# Patient Record
Sex: MALE | Race: WHITE | NOT HISPANIC OR LATINO | Employment: FULL TIME | URBAN - METROPOLITAN AREA
[De-identification: names, ages, dates, MRNs, and addresses within clinical notes are randomized per-mention and may not be internally consistent; named-entity substitution may affect disease eponyms.]

---

## 2024-06-19 ENCOUNTER — HOSPITAL ENCOUNTER (EMERGENCY)
Facility: HOSPITAL | Age: 34
Discharge: HOME | End: 2024-06-20
Attending: EMERGENCY MEDICINE
Payer: MEDICAID

## 2024-06-19 VITALS
OXYGEN SATURATION: 100 % | SYSTOLIC BLOOD PRESSURE: 151 MMHG | TEMPERATURE: 97.5 F | DIASTOLIC BLOOD PRESSURE: 101 MMHG | RESPIRATION RATE: 16 BRPM | HEART RATE: 85 BPM | BODY MASS INDEX: 24.09 KG/M2 | WEIGHT: 204 LBS | HEIGHT: 77 IN

## 2024-06-19 DIAGNOSIS — K64.4 EXTERNAL HEMORRHOID: Primary | ICD-10-CM

## 2024-06-19 PROCEDURE — 99283 EMERGENCY DEPT VISIT LOW MDM: CPT

## 2024-06-19 RX ORDER — HYDROCODONE BITARTRATE AND ACETAMINOPHEN 5; 325 MG/1; MG/1
1 TABLET ORAL EVERY 6 HOURS PRN
Status: DISCONTINUED | OUTPATIENT
Start: 2024-06-19 | End: 2024-06-20 | Stop reason: HOSPADM

## 2024-06-19 RX ORDER — LIDOCAINE HYDROCHLORIDE AND HYDROCORTISONE ACETATE 30; 5 MG/G; MG/G
1 CREAM RECTAL 2 TIMES DAILY PRN
Qty: 7 G | Refills: 2 | Status: SHIPPED | OUTPATIENT
Start: 2024-06-19 | End: 2024-06-26

## 2024-06-19 RX ORDER — HYDROCODONE BITARTRATE AND ACETAMINOPHEN 5; 325 MG/1; MG/1
1 TABLET ORAL EVERY 6 HOURS PRN
Qty: 12 TABLET | Refills: 0 | Status: SHIPPED | OUTPATIENT
Start: 2024-06-19

## 2024-06-19 RX ADMIN — HYDROCODONE BITARTRATE AND ACETAMINOPHEN 1 TABLET: 5; 325 TABLET ORAL at 23:43

## 2024-06-19 NOTE — Clinical Note
[facility] EMERGENCY DEPARTMENT  [address] [facility] [address]  Phone: [phone]    Randall Nathan was seen and treated in our emergency department on 6/19/2024.  He may return to work on 06/22/2024.         Thank you for choosing [facility].    Uli Rae MD

## 2024-06-19 NOTE — Clinical Note
[facility] EMERGENCY DEPARTMENT  [address] [facility] [address]  Phone: [phone]    Randall Nathan was seen and treated in our emergency department on 6/19/2024.  He may return to work on 06/22/2024.         Thank you for choosing [facility].    Uli Rae MD

## 2024-06-19 NOTE — Clinical Note
[facility] EMERGENCY DEPARTMENT  [address] [facility] [address]  Phone: [phone]    Randall Nathan was seen and treated in our emergency department on 6/19/2024.  He may return to work on 06/22/2024.         Thank you for choosing [facility].    Uli Rae MD

## 2024-06-19 NOTE — Clinical Note
[facility] EMERGENCY DEPARTMENT  [address] [facility] [address]  Phone: [phone]    Randall Nathan was seen and treated in our emergency department on 6/19/2024.  He may return to work on 06/22/2024.         Thank you for choosing [facility].    Uli Rae MD

## 2024-06-19 NOTE — Clinical Note
[facility] EMERGENCY DEPARTMENT  [address] [facility] [address]  Phone: [phone]    Randall Nathan was seen and treated in our emergency department on 6/19/2024.  He may return to work on 06/22/2024.         Thank you for choosing [facility].    Uli Rae MD

## 2024-06-20 NOTE — ED PROVIDER NOTES
EMERGENCY DEPARTMENT ENCOUNTER    Room Number:  13/13  PCP: Cecy Oquendo APRN  Patient Care Team:  Cecy Oquendo APRN as PCP - General (Family Medicine)   Independent Historians: Patient    HPI:  Chief Complaint: Hemorrhoid    A complete HPI/ROS/PMH/PSH/SH/FH are unobtainable due to: None    Chronic or social conditions impacting patient care (Social Determinants of Health): None  (Financial Resource Strain / Food Insecurity / Transportation Needs / Physical Activity / Stress / Social Connections / Intimate Partner Violence / Housing Stability)    Context: Randall Nathan is a 33 y.o. male who presents to the ED c/o acute hemorrhoid.  Patient has prior history of hemorrhoid and reports that last week flared up and caused him pain.  Reports no bleeding.  States has had hemorrhoids in the past.  Was seen in urgent care couple days ago.  Started on Anusol as well as perianal lidocaine hydrocortisone cream.  Patient states have not been helping.  Denies any bleeding.  No fever chills.    Review of prior external notes (non-ED) -and- Review of prior external test results outside of this encounter: Reviewed patient's urgent care visit from 6/17/2024    Prescription drug monitoring program review: MIROSLAVA reviewed by Uli Rae MD       PAST MEDICAL HISTORY  Active Ambulatory Problems     Diagnosis Date Noted    No Active Ambulatory Problems     Resolved Ambulatory Problems     Diagnosis Date Noted    No Resolved Ambulatory Problems     No Additional Past Medical History         PAST SURGICAL HISTORY  No past surgical history on file.      FAMILY HISTORY  No family history on file.      SOCIAL HISTORY  Social History     Socioeconomic History    Marital status: Single         ALLERGIES  Hydrocodone        REVIEW OF SYSTEMS  Review of Systems  Included in HPI  All systems reviewed and negative except for those discussed in HPI.      PHYSICAL EXAM    I have reviewed the triage vital signs and nursing  notes.    ED Triage Vitals   Temp Heart Rate Resp BP SpO2   06/19/24 2250 06/19/24 2250 06/19/24 2250 06/19/24 2256 06/19/24 2250   97.5 °F (36.4 °C) 85 16 (!) 151/101 100 %      Temp src Heart Rate Source Patient Position BP Location FiO2 (%)   06/19/24 2250 06/19/24 2250 06/19/24 2256 06/19/24 2256 --   Tympanic Monitor Sitting Right arm        Physical Exam  GENERAL: alert, no acute distress  SKIN: Warm, dry  HENT: Normocephalic, atraumatic  EYES: no scleral icterus  CV: regular rhythm, regular rate  RESPIRATORY: normal effort, lungs clear  ABDOMEN: soft, nontender, nondistended, external hemorrhoid no active bleeding  MUSCULOSKELETAL: no deformity  NEURO: alert, moves all extremities, follows commands                                                               LAB RESULTS  No results found for this or any previous visit (from the past 24 hour(s)).    I ordered the above labs and independently reviewed the results.        RADIOLOGY  No Radiology Exams Resulted Within Past 24 Hours    I ordered the above noted radiological studies. Reviewed by me and discussed with radiologist.  See dictation for official radiology interpretation.      PROCEDURES    Procedures      MEDICATIONS GIVEN IN ER    Medications   HYDROcodone-acetaminophen (NORCO) 5-325 MG per tablet 1 tablet (has no administration in time range)         ORDERS PLACED DURING THIS VISIT:  No orders of the defined types were placed in this encounter.        PROGRESS, DATA ANALYSIS, CONSULTS, AND MEDICAL DECISION MAKING    All labs have been independently interpreted by me.  All radiology studies have been reviewed by me and discussed with radiologist dictating the report.   EKG's independently viewed and interpreted by me.  Discussion below represents my analysis of pertinent findings related to patient's condition, differential diagnosis, treatment plan and final disposition.    Differential diagnosis includes but is not limited to internal hemorrhoid,  external hemorrhoid, perianal abscess.    Clinical Scores:                           PPE: The patient wore a mask and I wore an N95 mask throughout the entire patient encounter.       AS OF 23:43 EDT VITALS:    BP - (!) 151/101  HR - 85  TEMP - 97.5 °F (36.4 °C) (Tympanic)  O2 SATS - 100%        DIAGNOSIS  Final diagnoses:   External hemorrhoid         DISPOSITION  ED Disposition       None               Note Disclaimer: At [facility], we believe that sharing information builds trust and better relationships. You are receiving this note because you recently visited [facility]. It is possible you will see health information before a provider has talked with you about it. This kind of information can be easy to misunderstand. To help you fully understand what it means for your health, we urge you to discuss this note with your provider.         Uli Rae MD  06/19/24 [id number]